# Patient Record
Sex: FEMALE | Race: WHITE | NOT HISPANIC OR LATINO | URBAN - METROPOLITAN AREA
[De-identification: names, ages, dates, MRNs, and addresses within clinical notes are randomized per-mention and may not be internally consistent; named-entity substitution may affect disease eponyms.]

---

## 2023-03-16 PROBLEM — I42.8 NICM (NONISCHEMIC CARDIOMYOPATHY): Status: ACTIVE | Noted: 2023-03-16

## 2023-04-11 PROBLEM — I21.4 NSTEMI (NON-ST ELEVATED MYOCARDIAL INFARCTION): Status: ACTIVE | Noted: 2022-08-02

## 2023-04-11 PROBLEM — D64.9 ANEMIA: Status: ACTIVE | Noted: 2022-08-02

## 2023-04-11 PROBLEM — I42.9 CHF WITH CARDIOMYOPATHY: Status: ACTIVE | Noted: 2023-04-11

## 2023-04-11 PROBLEM — Z95.810 S/P ICD (INTERNAL CARDIAC DEFIBRILLATOR) PROCEDURE: Status: ACTIVE | Noted: 2023-04-11

## 2023-04-11 PROBLEM — A54.9 GONORRHEA: Status: ACTIVE | Noted: 2022-08-04

## 2023-04-11 PROBLEM — A51.49 SECONDARY SYPHILIS: Status: ACTIVE | Noted: 2022-08-04

## 2023-04-11 PROBLEM — F15.10 DRUG ABUSE, AMPHETAMINE TYPE: Status: ACTIVE | Noted: 2022-08-03

## 2023-04-11 PROBLEM — I50.40 COMBINED SYSTOLIC AND DIASTOLIC CONGESTIVE HEART FAILURE: Status: ACTIVE | Noted: 2022-08-03

## 2023-04-11 PROBLEM — F32.A DEPRESSION: Status: ACTIVE | Noted: 2023-04-11

## 2023-04-11 PROBLEM — I50.9 CHF WITH CARDIOMYOPATHY: Status: ACTIVE | Noted: 2023-04-11

## 2023-04-11 PROBLEM — I50.22 CHRONIC SYSTOLIC HEART FAILURE: Status: ACTIVE | Noted: 2023-04-11

## 2023-04-12 PROBLEM — E78.1 PURE HYPERTRIGLYCERIDEMIA: Status: ACTIVE | Noted: 2023-04-12

## 2023-04-12 PROBLEM — E66.01 CLASS 2 SEVERE OBESITY WITH SERIOUS COMORBIDITY AND BODY MASS INDEX (BMI) OF 38.0 TO 38.9 IN ADULT: Status: ACTIVE | Noted: 2023-04-12

## 2023-04-18 ENCOUNTER — TELEPHONE (OUTPATIENT)
Dept: ADMINISTRATIVE | Facility: CLINIC | Age: 48
End: 2023-04-18

## 2023-04-18 NOTE — PROGRESS NOTES
"Phoned patient in response to reply of "2" to post-discharge texting tracker. Left message including OOC nurse line for any possible symptoms.  Placed f/u call but no answer. Left more detailed message, reminding patient that they are enrolled in 10 day texting tracker to which they should reply accordingly if a call back is needed. Also left OOC number for any possible symptoms.    "

## 2023-04-23 ENCOUNTER — TELEPHONE (OUTPATIENT)
Dept: ADMINISTRATIVE | Facility: CLINIC | Age: 48
End: 2023-04-23

## 2023-04-23 ENCOUNTER — PATIENT OUTREACH (OUTPATIENT)
Dept: ADMINISTRATIVE | Facility: OTHER | Age: 48
End: 2023-04-23

## 2023-04-23 NOTE — PROGRESS NOTES
CHW - Case Closure    This Community Health Worker spoke to patient via telephone today.   Pt reported: Completed SDOH with patient via telephone, Patient states all resources around in the area doesn't have any funds, patient declined.   Pt denied any additional needs at this time and agrees with episode closure at this time.  Provided patient with Community Health Worker's contact information and encouraged her to contact this Community Health Worker if additional needs arise.

## 2023-07-17 PROBLEM — I21.4 NSTEMI (NON-ST ELEVATED MYOCARDIAL INFARCTION): Status: RESOLVED | Noted: 2022-08-02 | Resolved: 2023-07-17
